# Patient Record
Sex: MALE | Race: WHITE | NOT HISPANIC OR LATINO | Employment: FULL TIME | URBAN - METROPOLITAN AREA
[De-identification: names, ages, dates, MRNs, and addresses within clinical notes are randomized per-mention and may not be internally consistent; named-entity substitution may affect disease eponyms.]

---

## 2020-08-07 ENCOUNTER — TRANSCRIBE ORDERS (OUTPATIENT)
Dept: ADMINISTRATIVE | Facility: HOSPITAL | Age: 35
End: 2020-08-07

## 2020-08-07 DIAGNOSIS — N50.9 DISEASE OF SEMINAL VESICLE: Primary | ICD-10-CM

## 2020-08-14 ENCOUNTER — HOSPITAL ENCOUNTER (OUTPATIENT)
Dept: ULTRASOUND IMAGING | Facility: MEDICAL CENTER | Age: 35
Discharge: HOME | End: 2020-08-14
Payer: COMMERCIAL

## 2020-08-14 DIAGNOSIS — N50.9 DISEASE OF SEMINAL VESICLE: ICD-10-CM

## 2020-08-14 PROCEDURE — 76870 US EXAM SCROTUM: CPT

## 2020-08-24 ENCOUNTER — TELEPHONE (OUTPATIENT)
Dept: UROLOGY | Facility: MEDICAL CENTER | Age: 35
End: 2020-08-24

## 2020-08-24 NOTE — TELEPHONE ENCOUNTER
Triage New Patient  No previous urologist  [facility] [id number]   S/P Urgent Care secondary to lump on the testicle    [facility]   Prefers the [address] office  Patient can be reached at [phone]  Thank you

## 2020-08-25 NOTE — TELEPHONE ENCOUNTER
Called and spoke to pt he visited the urgent care 8/7/20 pt then had US done  8/14/20     Pt stated 2 moths before visiting urgent care he noticed a lump but did not think anything of it then it got bigger so he visited urgent care  Pt is not in any pain  IMPRESSION:     Prominent left epididymis containing multiple simple cysts    Bilateral small hydrocele, more on the left than right     scheduled pt appointment with PA 10/23 9:45 at the [facility]    Please advise if this is okay

## 2020-08-25 NOTE — TELEPHONE ENCOUNTER
If we are wero to get him in sooner, I would rather that  In the interim, please notify him to wear tighter fitting underwear, motrin as needed for pain and discomfort/swelling   Ice/heat as needed for comfort

## 2020-08-25 NOTE — TELEPHONE ENCOUNTER
Spoke with patient  Informed him of MICHAEL Saldaña recommendations  Also rescheduled patient at a sooner appiontment for Friday 8/28/ with Dr Sia Paige  Patient will call back if he is unable to make this appointment  He is aware of location  If patient has to reschedule please schedule with next available  At either [place] location

## 2020-08-28 ENCOUNTER — OFFICE VISIT (OUTPATIENT)
Dept: UROLOGY | Facility: MEDICAL CENTER | Age: 35
End: 2020-08-28
Payer: COMMERCIAL

## 2020-08-28 VITALS
WEIGHT: 205 LBS | SYSTOLIC BLOOD PRESSURE: 140 MMHG | HEIGHT: 72 IN | TEMPERATURE: 98.4 F | DIASTOLIC BLOOD PRESSURE: 80 MMHG | BODY MASS INDEX: 27.77 KG/M2 | HEART RATE: 77 BPM

## 2020-08-28 DIAGNOSIS — Z30.09 STERILIZATION CONSULT: Primary | ICD-10-CM

## 2020-08-28 DIAGNOSIS — N43.42 SPERMATOCELE OF EPIDIDYMIS, MULTIPLE: ICD-10-CM

## 2020-08-28 DIAGNOSIS — N43.3 BILATERAL HYDROCELE: ICD-10-CM

## 2020-08-28 PROCEDURE — 99204 OFFICE O/P NEW MOD 45 MIN: CPT | Performed by: UROLOGY

## 2020-08-28 NOTE — PROGRESS NOTES
Assessment/Plan:   1  Elective sterilization -plan bilateral vasectomy  The patient was made aware the possibility of bleeding infection recanalization persistent or recurrent fertility and pain syndrome  He agrees the procedure  2  Bilateral hydroceles -left greater than right- no intervention as these are palpably minimal and basically just ultrasound findings  3  Bilateral epididymal cysts/spermatoceles -largest and most noticeable by patient is at the head of the left epididymis  No intervention is recommended at this point  No problem-specific Assessment & Plan notes found for this encounter  Diagnoses and all orders for this visit:    Sterilization consult  Comments:   plan vasectomy  Orders:  -     Vasectomy; Future  -     Semen analysis, post-vasectomy; Future  -     Semen analysis, post-vasectomy; Future    Bilateral hydrocele  Comments:   left greater than right -no intervention    Spermatocele of epididymis, multiple  Comments:   largest palpable as head left epididymis -no intervention          Subjective:      Patient ID: Rula Ours is a 38 y o  male  HPI    70-year-old male with no children presents for elective sterilization consult  We discussed elective vasectomy in the patient agrees to the procedure  In addition the patient apparently felt a lump over his left testis and presented to an outpatient emergency center at which point ultrasound of the testes revealed bilateral hydroceles that were somewhat small left greater than right as well as epididymal cysts  No solid masses of either the adnexa or the testis were identified  Patient presents for evaluation  The following portions of the patient's history were reviewed and updated as appropriate: allergies, current medications, past family history, past medical history, past social history, past surgical history and problem list     Review of Systems   All other systems reviewed and are negative  Objective:      /80   Pulse 77   Temp 98 4 °F (36 9 °C)   Ht 6' (1 829 m)   Wt 93 kg (205 lb)   BMI 27 80 kg/m²          Physical Exam  Vitals signs reviewed  Constitutional:       Appearance: Normal appearance  HENT:      Head: Normocephalic and atraumatic  Eyes:      Extraocular Movements: Extraocular movements intact  Neck:      Musculoskeletal: Normal range of motion  Cardiovascular:      Rate and Rhythm: Normal rate and regular rhythm  Pulmonary:      Effort: Pulmonary effort is normal  No respiratory distress  Breath sounds: Normal breath sounds  No wheezing  Abdominal:      Palpations: Abdomen is soft  Genitourinary:     Penis: Normal        Comments: Scrotum normal without cutaneous lesions  Testes bilaterally descended masses or adnexal abnormality except for a less than 1 cm smooth round epididymal cyst at the head of the left epididymis  Bilateral Vasa easily palpable  Musculoskeletal: Normal range of motion  Skin:     General: Skin is dry  Neurological:      General: No focal deficit present  Mental Status: He is alert and oriented to person, place, and time  Psychiatric:         Mood and Affect: Mood normal          Behavior: Behavior normal          Thought Content:  Thought content normal          Judgment: Judgment normal

## 2020-08-28 NOTE — PATIENT INSTRUCTIONS
Vasectomy   WHAT YOU NEED TO KNOW:   A vasectomy is a procedure to make you sterile  It is a permanent form of birth control  The vas deferens (sperm tubes) are cut so that the semen does not contain sperm  DISCHARGE INSTRUCTIONS:   Medicines: You may need any of the following:  · NSAIDs  help decrease swelling, pain, and fever  This medicine can be bought with or without a doctor's order  This medicine can cause stomach bleeding or kidney problems in certain people  If you take blood thinner medicine, always ask your healthcare provider if NSAIDs are safe for you  Always read the medicine label and follow the directions on it before using this medicine  · Take your medicine as directed  Contact your healthcare provider if you think your medicine is not helping or if you have side effects  Tell him if you are allergic to any medicine  Keep a list of the medicines, vitamins, and herbs you take  Include the amounts, and when and why you take them  Bring the list or the pill bottles to follow-up visits  Carry your medicine list with you in case of an emergency  Decrease pain and swelling:   · Lie on your back  as much as possible the day of your procedure  Place a cushion such as a washcloth or small towel under your scrotum to elevate it  · Apply ice  on your scrotum for 15 to 20 minutes every hour for 2 days  Use an ice pack, or put crushed ice in a plastic bag  Cover it with a towel  Ice helps prevent tissue damage and decreases swelling and pain  · Wear an athletic supporter for at least 2 days  This will decrease pain and swelling, and protect your wound  Wound care:  Care for your wound as directed  Carefully wash the wound with soap and water  Dry the area and put on new, clean bandages as directed  Change your bandages when they get wet or dirty  Activity:  You may walk and drive normally the day after your procedure   Do not play sports, do yard work, or lift anything heavy until your healthcare provider says it is okay  You may need to wait up to a week before you have sex  Follow up with your healthcare provider or surgeon in 12 weeks: You will need to return to have your semen tested for sperm  Write down your questions so you remember to ask them during your visits  Contact your healthcare provider or surgeon if:   · You have a fever  · Your wound is red, swollen, or draining pus  · You feel pain or burning when you urinate  · You have worsening pain in your scrotum, even after you take medicine  · You have questions or concerns about your condition or care  Seek care immediately or call 911 if:   · Blood soaks through your bandage  · Your stitches come apart  · You see blood in your urine or semen  © 2017 [employer] Information is for End User's use only and may not be sold, redistributed or otherwise used for commercial purposes  All illustrations and images included in CareNotes® are the copyrighted property of [employer]  or [employer] Orell[employer]  The above information is an  only  It is not intended as medical advice for individual conditions or treatments  Talk to your doctor, nurse or pharmacist before following any medical regimen to see if it is safe and effective for you

## 2020-10-08 ENCOUNTER — PROCEDURE VISIT (OUTPATIENT)
Dept: UROLOGY | Facility: MEDICAL CENTER | Age: 35
End: 2020-10-08
Payer: COMMERCIAL

## 2020-10-08 VITALS — HEIGHT: 72 IN | BODY MASS INDEX: 27.77 KG/M2 | WEIGHT: 205 LBS | TEMPERATURE: 97.5 F

## 2020-10-08 DIAGNOSIS — Z30.2 ENCOUNTER FOR STERILIZATION: Primary | ICD-10-CM

## 2020-10-08 PROCEDURE — 88302 TISSUE EXAM BY PATHOLOGIST: CPT | Performed by: PATHOLOGY

## 2020-10-08 PROCEDURE — 55250 REMOVAL OF SPERM DUCT(S): CPT | Performed by: UROLOGY

## 2020-10-23 ENCOUNTER — PROCEDURE VISIT (OUTPATIENT)
Dept: UROLOGY | Facility: MEDICAL CENTER | Age: 35
End: 2020-10-23
Payer: COMMERCIAL

## 2020-10-23 VITALS
DIASTOLIC BLOOD PRESSURE: 80 MMHG | TEMPERATURE: 97.5 F | SYSTOLIC BLOOD PRESSURE: 132 MMHG | HEIGHT: 72 IN | BODY MASS INDEX: 28.04 KG/M2 | HEART RATE: 60 BPM | WEIGHT: 207 LBS

## 2020-10-23 DIAGNOSIS — Z30.09 STERILIZATION CONSULT: Primary | ICD-10-CM

## 2020-10-23 PROCEDURE — 99211 OFF/OP EST MAY X REQ PHY/QHP: CPT
